# Patient Record
Sex: FEMALE
[De-identification: names, ages, dates, MRNs, and addresses within clinical notes are randomized per-mention and may not be internally consistent; named-entity substitution may affect disease eponyms.]

---

## 2019-12-13 ENCOUNTER — HOSPITAL ENCOUNTER (OUTPATIENT)
Dept: HOSPITAL 75 - RAD | Age: 19
End: 2019-12-13
Attending: OBSTETRICS & GYNECOLOGY
Payer: COMMERCIAL

## 2019-12-13 DIAGNOSIS — Z3A.20: ICD-10-CM

## 2019-12-13 DIAGNOSIS — Z34.92: Primary | ICD-10-CM

## 2019-12-13 PROCEDURE — 76805 OB US >/= 14 WKS SNGL FETUS: CPT

## 2019-12-13 NOTE — DIAGNOSTIC IMAGING REPORT
INDICATION: Fetal anatomic assessment during normal pregnancy.



TECHNIQUE: Multiple real-time grayscale images were obtained over

the gravid uterus.



COMPARISON: None.



FINDINGS: Single viable intrauterine pregnancy, currently in

breech presentation. Posterior-positioned placenta without

evidence for previa. Normal amount volume of amniotic fluid,

index 12.11 cm.



The visualized anatomical structures including the kidneys,

bladder, stomach, intracranial structures, as well as

four-chamber heart appearing unremarkable. The extremities and

fetal spine as well as three-vessel cord and cord insertion site

appearing unremarkable. Pelvic structures including gender

determination unable to be assessed secondary to fetal

positioning. Gestational sac configuration is unremarkable.



Biometrical measurements are as follows:

Biparietal 4.60 cm, age 20 weeks 0 days.

Head circumference 17.78 cm, age 20 weeks 2 days.

Abdominal circumference 14.45 cm, age 19 weeks 6 days.

Femur length 3.38 cm, age 20 weeks 5 days.



Sonographic estimate age: 20 weeks 2 days.

Sonographic estimated date of delivery: 04/29/2020.



Estimated Fetal Weight: 336 gm (+/- 49  gm).

LMP percentile: 31%.



Fetal heart rate: 147 beats per minute.



Fetal number: 1 of 1.



IMPRESSION:

1. Single viable intrauterine pregnancy, currently in breech

presentation. Sonographic estimated age at 20 weeks 2 days for an

estimated date of delivery of April 29, 2020. No abnormalities

demonstrated at this time.



Dictated by: 



  Dictated on workstation # [id number]

## 2020-02-21 ENCOUNTER — HOSPITAL ENCOUNTER (OUTPATIENT)
Dept: HOSPITAL 75 - RAD | Age: 20
End: 2020-02-21
Attending: OBSTETRICS & GYNECOLOGY
Payer: COMMERCIAL

## 2020-02-21 DIAGNOSIS — O26.843: Primary | ICD-10-CM

## 2020-02-21 DIAGNOSIS — Z3A.29: ICD-10-CM

## 2020-02-21 PROCEDURE — 76816 OB US FOLLOW-UP PER FETUS: CPT

## 2020-03-10 ENCOUNTER — HOSPITAL ENCOUNTER (OUTPATIENT)
Dept: HOSPITAL 75 - WSO | Age: 20
Discharge: HOME | End: 2020-03-10
Attending: OBSTETRICS & GYNECOLOGY
Payer: SELF-PAY

## 2020-03-10 VITALS — WEIGHT: 146.61 LBS | BODY MASS INDEX: 25.03 KG/M2 | HEIGHT: 64.17 IN

## 2020-03-10 VITALS — DIASTOLIC BLOOD PRESSURE: 57 MMHG | SYSTOLIC BLOOD PRESSURE: 116 MMHG

## 2020-03-10 LAB
ALBUMIN SERPL-MCNC: 3.5 GM/DL (ref 3.2–4.5)
ALP SERPL-CCNC: 158 U/L (ref 40–136)
ALT SERPL-CCNC: 23 U/L (ref 0–55)
BASOPHILS # BLD AUTO: 0 10^3/UL (ref 0–0.1)
BASOPHILS NFR BLD AUTO: 0 % (ref 0–10)
BASOPHILS NFR BLD MANUAL: 0 %
BILIRUB SERPL-MCNC: 0.3 MG/DL (ref 0.1–1)
BUN/CREAT SERPL: 10
CALCIUM SERPL-MCNC: 9.3 MG/DL (ref 8.5–10.1)
CHLORIDE SERPL-SCNC: 108 MMOL/L (ref 98–107)
CO2 SERPL-SCNC: 20 MMOL/L (ref 21–32)
CREAT SERPL-MCNC: 0.59 MG/DL (ref 0.6–1.3)
EOSINOPHIL # BLD AUTO: 0.2 10^3/UL (ref 0–0.3)
EOSINOPHIL NFR BLD AUTO: 1 % (ref 0–10)
EOSINOPHIL NFR BLD MANUAL: 0 %
ERYTHROCYTE [DISTWIDTH] IN BLOOD BY AUTOMATED COUNT: 13 % (ref 10–14.5)
GFR SERPLBLD BASED ON 1.73 SQ M-ARVRAT: > 60 ML/MIN
GLUCOSE SERPL-MCNC: 89 MG/DL (ref 70–105)
HCT VFR BLD CALC: 38 % (ref 35–52)
HGB BLD-MCNC: 12.7 G/DL (ref 11.5–16)
LYMPHOCYTES # BLD AUTO: 2.1 X 10^3 (ref 1–4)
LYMPHOCYTES NFR BLD AUTO: 16 % (ref 12–44)
MCH RBC QN AUTO: 32 PG (ref 25–34)
MCHC RBC AUTO-ENTMCNC: 34 G/DL (ref 32–36)
MCV RBC AUTO: 95 FL (ref 80–99)
MONOCYTES # BLD AUTO: 1.1 X 10^3 (ref 0–1)
MONOCYTES NFR BLD AUTO: 9 % (ref 0–12)
MONOCYTES NFR BLD: 5 %
NEUTROPHILS # BLD AUTO: 9.3 X 10^3 (ref 1.8–7.8)
NEUTROPHILS NFR BLD AUTO: 74 % (ref 42–75)
NEUTS BAND NFR BLD MANUAL: 74 %
NEUTS BAND NFR BLD: 3 %
PLATELET # BLD: 171 10^3/UL (ref 130–400)
PMV BLD AUTO: 10.1 FL (ref 7.4–10.4)
POTASSIUM SERPL-SCNC: 3.7 MMOL/L (ref 3.6–5)
PROT SERPL-MCNC: 7.1 GM/DL (ref 6.4–8.2)
RBC MORPH BLD: NORMAL
SODIUM SERPL-SCNC: 137 MMOL/L (ref 135–145)
VARIANT LYMPHS NFR BLD MANUAL: 18 %
WBC # BLD AUTO: 12.7 10^3/UL (ref 4.3–11)

## 2020-03-10 PROCEDURE — 36415 COLL VENOUS BLD VENIPUNCTURE: CPT

## 2020-03-10 PROCEDURE — 85027 COMPLETE CBC AUTOMATED: CPT

## 2020-03-10 PROCEDURE — 99214 OFFICE O/P EST MOD 30 MIN: CPT

## 2020-03-10 PROCEDURE — 80053 COMPREHEN METABOLIC PANEL: CPT

## 2020-03-10 PROCEDURE — 85007 BL SMEAR W/DIFF WBC COUNT: CPT

## 2020-03-10 NOTE — NUR
NEHA CAR presented to unit via ambulatory, accompanied by significant other, with 
c/o LOWER ABDOMINAL PAIN. NEHA CAR weighed, gowned, voided, and to bed.  EFHM and 
TOCO applied, VS taken.  NEHA CAR oriented to bed controls, call light, TV, heat, 
and A/C controls.

## 2020-03-10 NOTE — NUR
THIS RN AT BEDSIDE. PT CO INTERMITTENT LOWER ABDOMINAL & BACK PAIN FOR LAST 2 DAYS. PT 
SITTING QUIETLY, FLAT AFFECT, TEXTING ON PHONE, RATES PAIN 9/10. PT STATES  SHES HAD ABOUT 5 
 CUPS OF WATER TO DRINK TODAY. WHEN ASKED, PT STATES THAT SHE HAS HAD BURNING WHILE SEE 
URINATES, INCREASED URGENCY AND FREQUENCY FOR THE LAST 2 DAYS. N/V SINCE 2 DAYS. TYLENOL 
DECREASES PAIN ONLY SLIGHTLY. 



DR WEBBER ON UNIT AT 1400, THIS RN DISCUSSES PT REPORT WITH DR WEBBER, PRENATALS REVIEWED. DR WEBBER DOES NOT WANT SVE DONE, OCCASIONALLY UC, FHT STRIP  REVIEWED  BY DR WEBBER. URINE 
DIPSTICK RESULTS READ TO DR WEBBER. DR WEBBER ORDERS CBC & CMP.

## 2020-03-10 NOTE — NUR
RN CALLED DR WEBBER BACK AT THIS TIME. ORDER TO DISCHARGE PT TO HOME. RX FOR MACROBID CALLED 
INTO PT PREFERRED PHARMACY. DC INSTRUCTIONS GIVEN AND EXPLAINED TO PT AND SO. QUESTIONS 
ANSWERED.

## 2020-03-11 NOTE — PHYSICIAN QUERY-FINAL DX
Clinic Account Progress/Dx


Physician Query:


Please give diagnosis





Please include # weeks gestation


Date of Service





Mar 10, 2020 at 13:00











LEWIS COLEMAN                    Mar 11, 2020 08:27

## 2020-04-06 ENCOUNTER — HOSPITAL ENCOUNTER (OUTPATIENT)
Dept: HOSPITAL 75 - RAD | Age: 20
End: 2020-04-06
Attending: OBSTETRICS & GYNECOLOGY
Payer: COMMERCIAL

## 2020-04-06 DIAGNOSIS — O26.843: Primary | ICD-10-CM

## 2020-04-06 DIAGNOSIS — Z3A.35: ICD-10-CM

## 2020-04-06 PROCEDURE — 76805 OB US >/= 14 WKS SNGL FETUS: CPT

## 2020-04-06 PROCEDURE — 76816 OB US FOLLOW-UP PER FETUS: CPT

## 2020-04-06 NOTE — DIAGNOSTIC IMAGING REPORT
INDICATION: 

Evaluate fetal growth, position, and amniotic fluid volume.



TECHNIQUE: 

Multiple Real-time grayscale images were obtained over the gravid

uterus.



COMPARISON: 

02/21/2020.



FINDINGS: 

There is a single live fetus in a cephalic presentation. The

fetal heart rate was recorded at 130 BPM. The placenta is to the

left. The amniotic fluid index is lower limits at 7.9 cm. The

cervical length is approximately 3.0 cm.



Biometrical measurements are as follows:

Biparietal 9.09 cm, age 37 weeks 2 days.

Head circumference 32.81 cm, age 37 weeks 2 days.

Abdominal circumference 29.66 cm, age 33 weeks 5 days.

Femur length 6.71 cm, age 34 weeks 4 days.



Sonographic estimate age: 35 weeks 5 days.

Sonographic estimated date of delivery: 05/06/20.



Estimated Fetal Weight: 2463 gm (+/- 360  gm).

LMP percentile: 8%.



Fetal heart rate: 130 beats per minute.



Fetal number: 1 of 1.



IMPRESSION: 

Single live IUP of approximately 35-36 weeks gestational age

showing normal interval growth when compared with the exam from

02/21/2020. The amniotic fluid volume is lower limits of normal

with an LUDWIG of 7.9 cm.



Dictated by: 



  Dictated on workstation # [id number]

## 2020-04-15 ENCOUNTER — HOSPITAL ENCOUNTER (INPATIENT)
Dept: HOSPITAL 75 - LDRP | Age: 20
LOS: 4 days | Discharge: HOME | End: 2020-04-19
Attending: OBSTETRICS & GYNECOLOGY | Admitting: OBSTETRICS & GYNECOLOGY
Payer: COMMERCIAL

## 2020-04-15 VITALS — SYSTOLIC BLOOD PRESSURE: 105 MMHG | DIASTOLIC BLOOD PRESSURE: 67 MMHG

## 2020-04-15 VITALS — SYSTOLIC BLOOD PRESSURE: 115 MMHG | DIASTOLIC BLOOD PRESSURE: 83 MMHG

## 2020-04-15 VITALS — SYSTOLIC BLOOD PRESSURE: 122 MMHG | DIASTOLIC BLOOD PRESSURE: 83 MMHG

## 2020-04-15 VITALS — SYSTOLIC BLOOD PRESSURE: 118 MMHG | DIASTOLIC BLOOD PRESSURE: 83 MMHG

## 2020-04-15 VITALS — SYSTOLIC BLOOD PRESSURE: 125 MMHG | DIASTOLIC BLOOD PRESSURE: 61 MMHG

## 2020-04-15 VITALS — HEIGHT: 63.39 IN | BODY MASS INDEX: 26.74 KG/M2 | WEIGHT: 152.78 LBS

## 2020-04-15 VITALS — SYSTOLIC BLOOD PRESSURE: 111 MMHG | DIASTOLIC BLOOD PRESSURE: 71 MMHG

## 2020-04-15 VITALS — SYSTOLIC BLOOD PRESSURE: 116 MMHG | DIASTOLIC BLOOD PRESSURE: 78 MMHG

## 2020-04-15 VITALS — DIASTOLIC BLOOD PRESSURE: 56 MMHG | SYSTOLIC BLOOD PRESSURE: 118 MMHG

## 2020-04-15 VITALS — DIASTOLIC BLOOD PRESSURE: 80 MMHG | SYSTOLIC BLOOD PRESSURE: 126 MMHG

## 2020-04-15 VITALS — DIASTOLIC BLOOD PRESSURE: 57 MMHG | SYSTOLIC BLOOD PRESSURE: 107 MMHG

## 2020-04-15 VITALS — DIASTOLIC BLOOD PRESSURE: 85 MMHG | SYSTOLIC BLOOD PRESSURE: 123 MMHG

## 2020-04-15 VITALS — SYSTOLIC BLOOD PRESSURE: 115 MMHG | DIASTOLIC BLOOD PRESSURE: 77 MMHG

## 2020-04-15 VITALS — SYSTOLIC BLOOD PRESSURE: 119 MMHG | DIASTOLIC BLOOD PRESSURE: 70 MMHG

## 2020-04-15 DIAGNOSIS — O41.1230: ICD-10-CM

## 2020-04-15 DIAGNOSIS — Z3A.38: ICD-10-CM

## 2020-04-15 LAB
BASOPHILS # BLD AUTO: 0 10^3/UL (ref 0–0.1)
BASOPHILS NFR BLD AUTO: 0 % (ref 0–10)
EOSINOPHIL # BLD AUTO: 0.2 10^3/UL (ref 0–0.3)
EOSINOPHIL NFR BLD AUTO: 2 % (ref 0–10)
ERYTHROCYTE [DISTWIDTH] IN BLOOD BY AUTOMATED COUNT: 12.9 % (ref 10–14.5)
HCT VFR BLD CALC: 40 % (ref 35–52)
HGB BLD-MCNC: 13.5 G/DL (ref 11.5–16)
LYMPHOCYTES # BLD AUTO: 2.2 X 10^3 (ref 1–4)
LYMPHOCYTES NFR BLD AUTO: 20 % (ref 12–44)
MANUAL DIFFERENTIAL PERFORMED BLD QL: NO
MCH RBC QN AUTO: 31 PG (ref 25–34)
MCHC RBC AUTO-ENTMCNC: 34 G/DL (ref 32–36)
MCV RBC AUTO: 93 FL (ref 80–99)
MONOCYTES # BLD AUTO: 0.9 X 10^3 (ref 0–1)
MONOCYTES NFR BLD AUTO: 8 % (ref 0–12)
NEUTROPHILS # BLD AUTO: 7.7 X 10^3 (ref 1.8–7.8)
NEUTROPHILS NFR BLD AUTO: 70 % (ref 42–75)
PLATELET # BLD: 174 10^3/UL (ref 130–400)
PMV BLD AUTO: 10.5 FL (ref 7.4–10.4)
WBC # BLD AUTO: 10.9 10^3/UL (ref 4.3–11)

## 2020-04-15 PROCEDURE — 85025 COMPLETE CBC W/AUTO DIFF WBC: CPT

## 2020-04-15 PROCEDURE — 87185 SC STD ENZYME DETCJ PER NZM: CPT

## 2020-04-15 PROCEDURE — 87205 SMEAR GRAM STAIN: CPT

## 2020-04-15 PROCEDURE — 86900 BLOOD TYPING SEROLOGIC ABO: CPT

## 2020-04-15 PROCEDURE — 85027 COMPLETE CBC AUTOMATED: CPT

## 2020-04-15 PROCEDURE — 86850 RBC ANTIBODY SCREEN: CPT

## 2020-04-15 PROCEDURE — 80053 COMPREHEN METABOLIC PANEL: CPT

## 2020-04-15 PROCEDURE — 85007 BL SMEAR W/DIFF WBC COUNT: CPT

## 2020-04-15 PROCEDURE — 36415 COLL VENOUS BLD VENIPUNCTURE: CPT

## 2020-04-15 PROCEDURE — 87804 INFLUENZA ASSAY W/OPTIC: CPT

## 2020-04-15 PROCEDURE — 87077 CULTURE AEROBIC IDENTIFY: CPT

## 2020-04-15 PROCEDURE — 88307 TISSUE EXAM BY PATHOLOGIST: CPT

## 2020-04-15 PROCEDURE — 87186 SC STD MICRODIL/AGAR DIL: CPT

## 2020-04-15 PROCEDURE — 87075 CULTR BACTERIA EXCEPT BLOOD: CPT

## 2020-04-15 PROCEDURE — 87635 SARS-COV-2 COVID-19 AMP PRB: CPT

## 2020-04-15 PROCEDURE — 87088 URINE BACTERIA CULTURE: CPT

## 2020-04-15 PROCEDURE — 83605 ASSAY OF LACTIC ACID: CPT

## 2020-04-15 PROCEDURE — 87070 CULTURE OTHR SPECIMN AEROBIC: CPT

## 2020-04-15 PROCEDURE — 87076 CULTURE ANAEROBE IDENT EACH: CPT

## 2020-04-15 PROCEDURE — 86901 BLOOD TYPING SEROLOGIC RH(D): CPT

## 2020-04-15 RX ADMIN — SODIUM CHLORIDE, SODIUM LACTATE, POTASSIUM CHLORIDE, AND CALCIUM CHLORIDE SCH MLS/HR: 600; 310; 30; 20 INJECTION, SOLUTION INTRAVENOUS at 20:05

## 2020-04-15 RX ADMIN — SODIUM CHLORIDE, SODIUM LACTATE, POTASSIUM CHLORIDE, CALCIUM CHLORIDE, AND DEXTROSE MONOHYDRATE SCH MLS/HR: 600; 310; 30; 20; 5 INJECTION, SOLUTION INTRAVENOUS at 15:57

## 2020-04-15 RX ADMIN — SODIUM CHLORIDE, SODIUM LACTATE, POTASSIUM CHLORIDE, CALCIUM CHLORIDE, AND DEXTROSE MONOHYDRATE SCH MLS/HR: 600; 310; 30; 20; 5 INJECTION, SOLUTION INTRAVENOUS at 22:10

## 2020-04-15 RX ADMIN — SODIUM CHLORIDE, SODIUM LACTATE, POTASSIUM CHLORIDE, AND CALCIUM CHLORIDE SCH MLS/HR: 600; 310; 30; 20 INJECTION, SOLUTION INTRAVENOUS at 16:42

## 2020-04-15 RX ADMIN — MISOPROSTOL SCH MCG: 100 TABLET ORAL at 23:26

## 2020-04-15 RX ADMIN — MISOPROSTOL SCH MCG: 100 TABLET ORAL at 19:25

## 2020-04-15 NOTE — NUR
Dr Vazquez notified of vag exam of one and pt now desires to wait for epidural. Physician will 
allow regular diet for dinner then clear liquids.

## 2020-04-15 NOTE — NUR
Dr Vazquez called to inquire - informed her that pt rated pain an 8 was wanting and epidural. 
LR bolus infusing. Plan to re-check pt after epidural and call physician with exam.

## 2020-04-15 NOTE — NUR
NEHA MARSH presented to unit via ambulatory  from outpt registration, accompanied 
by  , with c/o ruptured membranes in office.  NEHA MARSH weighed, gowned, 
voided, and to bed.  EFHM and TOCO applied, VS taken.  NEHA MARSH oriented to bed 
controls, call light, TV, heat, and A/C controls.

## 2020-04-15 NOTE — XMS REPORT
Continuity of Care Document

                             Created on: 04/15/2020



NEAH MARSH

External Reference #: [id number]

: 2000

Sex: Female



Demographics





                          Address                   [address] [place]  [address]

 

                          Home Phone                [phone] x

 

                          Preferred Language        Unknown

 

                          Marital Status            Unknown

 

                          Jewish Affiliation     Unknown

 

                          Race                      Unknown

 

                          Ethnic Group              Unknown





Author





                          Organization              Unknown

 

                          Address                   Unknown

 

                          Phone                     Unavailable



              



Allergies

      



             Active           Description           Code           Type         

  Severity   

                Reaction           Onset           Reported/Identified          

 

Relationship to Patient                 Clinical Status        

 

                Yes             No Known Drug Allergies           [id number]    

       Drug 

Allergy           Unknown           N/A                             03/10/2020  

      

                                                             



                  



Medications

      



There is no data.                  



Problems

      



             Date Dx Coded           Attending           Type           Code    

       

Diagnosis                               Diagnosed By        

 

             2019           DUANE WEBBER DO           Ot           Z34.9

2           

ENCNTR FOR SUPRVSN OF NORMAL PREG, UNSP,                    

 

             2019           AKBAR DO DUANE C           Ot           Z3A.2

0           

20 WEEKS GESTATION OF PREGNANCY                    

 

             2019           WEBBER DO DUANE C           Ot           Z34.9

2           

ENCNTR FOR SUPRVSN OF NORMAL PREG, UNSP,                    

 

             2019           WEBBER DO DUANE C           Ot           Z3A.2

0           

20 WEEKS GESTATION OF PREGNANCY                    

 

             2020           AKBAR SYLVESTER DUANE C           Ot           Z34.9

2           

ENCNTR FOR SUPRVSN OF NORMAL PREG, UNSP,                    

 

             2020           AKBAR DO DUANE C           Ot           Z3A.2

0           

20 WEEKS GESTATION OF PREGNANCY                    

 

             2020           AKBAR DO DUANE C           Ot           O26.8

43           

UTERINE SIZE-DATE DISCREPANCY, THIRD TRI                    

 

             2020           WEBBER DO DUANE C           Ot           Z3A.2

9           

29 WEEKS GESTATION OF PREGNANCY                    

 

             2020           WEBBER DO DUANE C           Ot           O26.8

43           

UTERINE SIZE-DATE DISCREPANCY, THIRD TRI                    

 

             2020           WEBBER DO DUANE C           Ot           Z3A.3

5           

35 WEEKS GESTATION OF PREGNANCY                    



                                    



Procedures

      



There is no data.                  



Results

      



There is no data.              



Encounters

      



                ACCT No.           Visit Date/Time           Discharge          

 Status         

             Pt. Type           Provider           Facility           Loc./Unit 

          

Complaint        

 

                    [id number]           2020 10:39:00           

020 23:59:59        

                CLS             Outpatient           AKBAR SYLVESTER DUANE C          

 [facility]           RAD                       FUNDAL HEIGHT LOW FOR D

ATES IN 

THIRD TRIMESTER,34        

 

                    [id number]           2020 10:05:00           

020 23:59:59        

                CLS             Outpatient           DUANE WEBBER DO          

 [facility]           RAD                       FETAL SIZE        

 

                    [id number]           2019 13:07:00           

019 23:59:59        

                CLS             Outpatient           DUANE WEBBER DO          

 [facility]           RAD                       FETUS PRESENT DURING PA

EGNANCY IN 

2ND TRI        

 

             [id number]           2020 08:32:00                         

            

Document Registration

## 2020-04-15 NOTE — NUR
To pt room.  at bedside. Pt appears to be comfortable. Cytotec 50mcg given PO. Pt and 
 have no needs at this time.

## 2020-04-16 VITALS — DIASTOLIC BLOOD PRESSURE: 78 MMHG | SYSTOLIC BLOOD PRESSURE: 124 MMHG

## 2020-04-16 VITALS — DIASTOLIC BLOOD PRESSURE: 79 MMHG | SYSTOLIC BLOOD PRESSURE: 122 MMHG

## 2020-04-16 VITALS — SYSTOLIC BLOOD PRESSURE: 116 MMHG | DIASTOLIC BLOOD PRESSURE: 66 MMHG

## 2020-04-16 VITALS — SYSTOLIC BLOOD PRESSURE: 101 MMHG | DIASTOLIC BLOOD PRESSURE: 52 MMHG

## 2020-04-16 VITALS — DIASTOLIC BLOOD PRESSURE: 79 MMHG | SYSTOLIC BLOOD PRESSURE: 121 MMHG

## 2020-04-16 VITALS — DIASTOLIC BLOOD PRESSURE: 76 MMHG | SYSTOLIC BLOOD PRESSURE: 119 MMHG

## 2020-04-16 VITALS — SYSTOLIC BLOOD PRESSURE: 94 MMHG | DIASTOLIC BLOOD PRESSURE: 53 MMHG

## 2020-04-16 VITALS — SYSTOLIC BLOOD PRESSURE: 115 MMHG | DIASTOLIC BLOOD PRESSURE: 70 MMHG

## 2020-04-16 VITALS — SYSTOLIC BLOOD PRESSURE: 116 MMHG | DIASTOLIC BLOOD PRESSURE: 57 MMHG

## 2020-04-16 VITALS — DIASTOLIC BLOOD PRESSURE: 52 MMHG | SYSTOLIC BLOOD PRESSURE: 98 MMHG

## 2020-04-16 VITALS — SYSTOLIC BLOOD PRESSURE: 114 MMHG | DIASTOLIC BLOOD PRESSURE: 63 MMHG

## 2020-04-16 VITALS — SYSTOLIC BLOOD PRESSURE: 105 MMHG | DIASTOLIC BLOOD PRESSURE: 66 MMHG

## 2020-04-16 VITALS — SYSTOLIC BLOOD PRESSURE: 108 MMHG | DIASTOLIC BLOOD PRESSURE: 51 MMHG

## 2020-04-16 VITALS — DIASTOLIC BLOOD PRESSURE: 57 MMHG | SYSTOLIC BLOOD PRESSURE: 107 MMHG

## 2020-04-16 VITALS — SYSTOLIC BLOOD PRESSURE: 95 MMHG | DIASTOLIC BLOOD PRESSURE: 59 MMHG

## 2020-04-16 VITALS — DIASTOLIC BLOOD PRESSURE: 53 MMHG | SYSTOLIC BLOOD PRESSURE: 97 MMHG

## 2020-04-16 VITALS — DIASTOLIC BLOOD PRESSURE: 59 MMHG | SYSTOLIC BLOOD PRESSURE: 90 MMHG

## 2020-04-16 VITALS — DIASTOLIC BLOOD PRESSURE: 52 MMHG | SYSTOLIC BLOOD PRESSURE: 106 MMHG

## 2020-04-16 VITALS — DIASTOLIC BLOOD PRESSURE: 58 MMHG | SYSTOLIC BLOOD PRESSURE: 103 MMHG

## 2020-04-16 VITALS — DIASTOLIC BLOOD PRESSURE: 71 MMHG | SYSTOLIC BLOOD PRESSURE: 112 MMHG

## 2020-04-16 VITALS — SYSTOLIC BLOOD PRESSURE: 90 MMHG | DIASTOLIC BLOOD PRESSURE: 52 MMHG

## 2020-04-16 VITALS — SYSTOLIC BLOOD PRESSURE: 128 MMHG | DIASTOLIC BLOOD PRESSURE: 59 MMHG

## 2020-04-16 VITALS — SYSTOLIC BLOOD PRESSURE: 114 MMHG | DIASTOLIC BLOOD PRESSURE: 69 MMHG

## 2020-04-16 VITALS — SYSTOLIC BLOOD PRESSURE: 121 MMHG | DIASTOLIC BLOOD PRESSURE: 75 MMHG

## 2020-04-16 VITALS — SYSTOLIC BLOOD PRESSURE: 101 MMHG | DIASTOLIC BLOOD PRESSURE: 53 MMHG

## 2020-04-16 VITALS — DIASTOLIC BLOOD PRESSURE: 63 MMHG | SYSTOLIC BLOOD PRESSURE: 101 MMHG

## 2020-04-16 VITALS — SYSTOLIC BLOOD PRESSURE: 102 MMHG | DIASTOLIC BLOOD PRESSURE: 56 MMHG

## 2020-04-16 VITALS — SYSTOLIC BLOOD PRESSURE: 104 MMHG | DIASTOLIC BLOOD PRESSURE: 65 MMHG

## 2020-04-16 VITALS — DIASTOLIC BLOOD PRESSURE: 61 MMHG | SYSTOLIC BLOOD PRESSURE: 121 MMHG

## 2020-04-16 LAB
ALBUMIN SERPL-MCNC: 3.7 GM/DL (ref 3.2–4.5)
ALP SERPL-CCNC: 247 U/L (ref 40–136)
ALT SERPL-CCNC: 20 U/L (ref 0–55)
BASOPHILS # BLD AUTO: 0 10^3/UL (ref 0–0.1)
BASOPHILS NFR BLD AUTO: 0 % (ref 0–10)
BASOPHILS NFR BLD MANUAL: 0 %
BILIRUB SERPL-MCNC: 0.6 MG/DL (ref 0.1–1)
BUN/CREAT SERPL: 6
CALCIUM SERPL-MCNC: 9.9 MG/DL (ref 8.5–10.1)
CHLORIDE SERPL-SCNC: 107 MMOL/L (ref 98–107)
CO2 SERPL-SCNC: 18 MMOL/L (ref 21–32)
CREAT SERPL-MCNC: 0.65 MG/DL (ref 0.6–1.3)
EOSINOPHIL # BLD AUTO: 0.1 10^3/UL (ref 0–0.3)
EOSINOPHIL NFR BLD AUTO: 0 % (ref 0–10)
EOSINOPHIL NFR BLD MANUAL: 0 %
ERYTHROCYTE [DISTWIDTH] IN BLOOD BY AUTOMATED COUNT: 13.1 % (ref 10–14.5)
GFR SERPLBLD BASED ON 1.73 SQ M-ARVRAT: > 60 ML/MIN
GLUCOSE SERPL-MCNC: 82 MG/DL (ref 70–105)
HCT VFR BLD CALC: 44 % (ref 35–52)
HGB BLD-MCNC: 14.7 G/DL (ref 11.5–16)
LYMPHOCYTES # BLD AUTO: 1.9 X 10^3 (ref 1–4)
LYMPHOCYTES NFR BLD AUTO: 10 % (ref 12–44)
MANUAL DIFFERENTIAL PERFORMED BLD QL: YES
MCH RBC QN AUTO: 32 PG (ref 25–34)
MCHC RBC AUTO-ENTMCNC: 34 G/DL (ref 32–36)
MCV RBC AUTO: 95 FL (ref 80–99)
METAMYELOCYTES NFR BLD: 1 %
MONOCYTES # BLD AUTO: 1.7 X 10^3 (ref 0–1)
MONOCYTES NFR BLD AUTO: 8 % (ref 0–12)
MONOCYTES NFR BLD: 7 %
NEUTROPHILS # BLD AUTO: 16.2 X 10^3 (ref 1.8–7.8)
NEUTROPHILS NFR BLD AUTO: 82 % (ref 42–75)
NEUTS BAND NFR BLD MANUAL: 81 %
NEUTS BAND NFR BLD: 2 %
PLATELET # BLD: 138 10^3/UL (ref 130–400)
PMV BLD AUTO: 10.9 FL (ref 7.4–10.4)
POTASSIUM SERPL-SCNC: 4 MMOL/L (ref 3.6–5)
PROT SERPL-MCNC: 7.7 GM/DL (ref 6.4–8.2)
SODIUM SERPL-SCNC: 138 MMOL/L (ref 135–145)
TOXIC GRANULES BLD QL SMEAR: (no result)
VARIANT LYMPHS NFR BLD MANUAL: 1 %
VARIANT LYMPHS NFR BLD MANUAL: 8 %
WBC # BLD AUTO: 19.8 10^3/UL (ref 4.3–11)

## 2020-04-16 PROCEDURE — 0HQ9XZZ REPAIR PERINEUM SKIN, EXTERNAL APPROACH: ICD-10-PCS | Performed by: OBSTETRICS & GYNECOLOGY

## 2020-04-16 RX ADMIN — METRONIDAZOLE SCH MLS/HR: 5 INJECTION, SOLUTION INTRAVENOUS at 11:27

## 2020-04-16 RX ADMIN — IBUPROFEN SCH MG: 600 TABLET ORAL at 17:51

## 2020-04-16 RX ADMIN — Medication SCH MLS/HR: at 08:50

## 2020-04-16 RX ADMIN — ACETAMINOPHEN SCH MG: 500 TABLET ORAL at 23:56

## 2020-04-16 RX ADMIN — DOCUSATE SODIUM SCH MG: 100 CAPSULE ORAL at 20:46

## 2020-04-16 RX ADMIN — Medication SCH MLS/HR: at 11:43

## 2020-04-16 RX ADMIN — MINERAL OIL PRN ML: 1 OIL ORAL at 09:55

## 2020-04-16 RX ADMIN — MINERAL OIL PRN ML: 1 OIL ORAL at 09:58

## 2020-04-16 RX ADMIN — ACETAMINOPHEN SCH MG: 500 TABLET ORAL at 15:58

## 2020-04-16 RX ADMIN — AMPICILLIN SODIUM AND SULBACTAM SODIUM SCH MLS/HR: 2; 1 INJECTION, POWDER, FOR SOLUTION INTRAMUSCULAR; INTRAVENOUS at 17:52

## 2020-04-16 RX ADMIN — IBUPROFEN SCH MG: 600 TABLET ORAL at 11:28

## 2020-04-16 RX ADMIN — AMPICILLIN SODIUM AND SULBACTAM SODIUM SCH MLS/HR: 2; 1 INJECTION, POWDER, FOR SOLUTION INTRAMUSCULAR; INTRAVENOUS at 09:35

## 2020-04-16 RX ADMIN — IBUPROFEN SCH MG: 600 TABLET ORAL at 23:56

## 2020-04-16 RX ADMIN — Medication SCH MLS/HR: at 10:47

## 2020-04-16 RX ADMIN — SODIUM CHLORIDE, SODIUM LACTATE, POTASSIUM CHLORIDE, CALCIUM CHLORIDE, AND DEXTROSE MONOHYDRATE SCH MLS/HR: 600; 310; 30; 20; 5 INJECTION, SOLUTION INTRAVENOUS at 05:30

## 2020-04-16 RX ADMIN — METRONIDAZOLE SCH MLS/HR: 5 INJECTION, SOLUTION INTRAVENOUS at 20:46

## 2020-04-16 NOTE — OB LABOR & DELIVERY RECORD
Vag Delivery Note


Vag Delivery Note


Date of Delivery: 20 





Preoperative Diagnosis: Jose Mejia  is a 20  /Para   1/0 ,

Gestational Age 38 1/7 WEEKS, FEVER AND TACHYCARDIA IN LABOR


Postoperative Diagnosis: Same, FLU NEGATIVE; 


Surgeon: DUANE WEBBER 





Anesthesia: EPIDURAL





Delivery Type: VAGINAL





Findings: 


Viable FEMALE infant, apgars 9/9, weight 2700 GRAMS, FETAL TEMP - 101 AT 

DELIVERY, FOUL SMELLING FLUID


Lacerations: 1ST DEGREE


Intact placenta with 3 vessel cord. Nuchal cord, DELIVERED THROUGH, body cord or

shoulder dystocia








Estimated Blood Loss: 200 ml





Complications: None





Condition: Stable





Description of Procedure:





The patient is a 19 year old female who presented WITH VAGINAL BLEEDING AND 

RUPTURE OF MEMBRANES IN OFFICE.  She was not in labor (premature ROM at 38 

weeks, GBS -). She was admitted and informed consent was obtained. Her labor 

course was remarkable for augmentation, fever noted to 103 just prior to 

delivery.  RN noted a few hours prior that her cervix was unchanged.  She had 

one dose of misoprostol orally due to premature rupture of membranes (cervix was

unfavorable in the clinic)Second dose was held due to frequent contractions and 

no accelerations.  She was started on Oxytocin but had variable decelerations 

with only 2 mu of pitocin.  Approximately 545 am I was notified that the patient

had suddenly developed tachycardia.  She had baseline heartrate about 100-110 

and suddenly this accelerated to 130.  just prior to this she had been redosed 

on her epidural.  She also was afebrile.  a lab workup was initiated. This also 

included swabs for influenza and Corona virus. She was thus treated as a PUI 

during the rest of labor and delivery.  Approximately 0645, her temp recorded as

37.  Then at 0730 I was notified temperature was 38.6.  She was given 650 mg 

acetaminophen IN  and started on Unasyn.  During the initiation of antibiotics 

treatment I checked her and she was noted to be completely dilated with 2+ 

station.  She progressed to complete dilatation and began to push. 





She was then set up for delivery. The infant's head was delivered atraumatically

in the OA position. The shoulders and remainder of the infant's body were then 

delivered without difficulty. Upon delivery, the head was held below the level 

of the perineum and the mouth and nares were bulb suctioned. The cord was doubly

clamped and cut and the infant was handed off to the pediatric staff. An intact 

placenta with 3-vessel cord delivered via Sania and there was found to be 

minimal bleeding.~ Vigorous fundal massage was performed and the fundus was 

found to be firm. IV oxytocin was given. Examination of the vagina and perineum 

revealed a 1ST DEGREE laceration repaired in the usual fashion with 3-0 vicryl 

suture. Following the repair, sponge, instrument and needle counts were correct.

Mom and baby were both in stable condition in the labor suite.





Vitals - Labs


Vital Signs - I&O





Vital Signs








  Date Time  Temp Pulse Resp B/P (MAP) Pulse Ox O2 Delivery O2 Flow Rate FiO2


 


20 06:45 37.3 137 18 124/78 (93) 100   


 


20 06:00 36.9 131 18 128/59 (82) 100   


 


20 05:00  119 18 119/76 (90) 100   


 


20 04:04  92 20 122/79 (93) 100   


 


20 04:00  119 18 119/76 (90) 100   


 


20 03:00  100 18 114/69 (84) 100   


 


20 02:00  75 20 115/70 (85) 100   


 


20 01:00  71 18 101/63 (76) 100   


 


20 00:00 36.2 83 20 114/63 (80) 100   


 


4/15/20 23:00  90  105/67 (80)   98.00 


 


4/15/20 22:00  104  118/56 (76)    


 


4/15/20 21:00 36.6 90  119/70 (86)    


 


4/15/20 20:00  96  126/80 (95)    


 


4/15/20 18:40  103  125/61 (82)  Room Air  


 


4/15/20 17:30  96  107/57 (74)  Room Air  


 


4/15/20 17:00    115/83 (94)  Room Air  


 


4/15/20 16:30  91  123/85 (98)  Room Air  


 


4/15/20 16:20  97  116/78 (91)    


 


4/15/20 16:00 37.4 92  118/83 (95)    


 


4/15/20 15:43  92  111/71 (84) 98 Room Air  


 


4/15/20 15:20  93 18 116/78 (91)  Room Air  


 


4/15/20 14:50  77  115/77 (90)    


 


4/15/20 14:00 36.8 90 16  97 Room Air  


 


4/15/20 14:00 36.8 99 18 122/83 (96) 97 Room Air  














I & O 


 


 20





 07:00


 


Intake Total 1000 ml


 


Balance 1000 ml











Labs


Laboratory Tests


4/15/20 15:25: 


White Blood Count 10.9, Red Blood Count 4.30L, Hemoglobin 13.5, Hematocrit 40, 

Mean Corpuscular Volume 93, Mean Corpuscular Hemoglobin 31, Mean Corpuscular 

Hemoglobin Concent 34, Red Cell Distribution Width 12.9, Platelet Count 174, 

Mean Platelet Volume 10.5H, Neutrophils (%) (Auto) 70, Lymphocytes (%) (Auto) 

20, Monocytes (%) (Auto) 8, Eosinophils (%) (Auto) 2, Basophils (%) (Auto) 0, 

Neutrophils # (Auto) 7.7, Lymphocytes # (Auto) 2.2, Monocytes # (Auto) 0.9, 

Eosinophils # (Auto) 0.2, Basophils # (Auto) 0.0


20 06:40: 


White Blood Count 19.8H, Red Blood Count 4.60, Hemoglobin 14.7, Hematocrit 44, 

Mean Corpuscular Volume 95, Mean Corpuscular Hemoglobin 32, Mean Corpuscular 

Hemoglobin Concent 34, Red Cell Distribution Width 13.1, Platelet Count 138, 

Mean Platelet Volume 10.9H, Neutrophils (%) (Auto) 82H, Lymphocytes (%) (Auto) 

10L, Monocytes (%) (Auto) 8, Eosinophils (%) (Auto) 0, Basophils (%) (Auto) 0, 

Neutrophils # (Auto) 16.2H, Lymphocytes # (Auto) 1.9, Monocytes # (Auto) 1.7H, 

Eosinophils # (Auto) 0.1, Basophils # (Auto) 0.0, Neutrophils % (Manual) 81, 

Lymphocytes % (Manual) 8, Monocytes % (Manual) 7, Eosinophils % (Manual) 0, 

Basophils % (Manual) 0, Metamyelocytes % 1, Band Neutrophils 2, Reactive 

Lymphocytes 1, Toxic Granulation 1+, Sodium Level 138, Potassium Level 4.0, Ch

loride Level 107, Carbon Dioxide Level 18L, Anion Gap 13, Blood Urea Nitrogen 4L

, Creatinine 0.65, Estimat Glomerular Filtration Rate > 60, BUN/Creatinine Ratio

6, Glucose Level 82, Calcium Level 9.9, Corrected Calcium 10.1, Total Bilirubin 

0.6, Aspartate Amino Transf (AST/SGOT) 27, Alanine Aminotransferase (ALT/SGPT) 

20, Alkaline Phosphatase 247H, Total Protein 7.7, Albumin 3.7


20 08:23: 





Microbiology


20 Influenza Types A,B Antigen (ELLIOT) - Final, Complete











DUANE WEBBER DO               2020 10:32

## 2020-04-16 NOTE — NUR
Up to BR with assistance. Voided large amt. Vanessa care done.

1600 Transferred to postpartum room 303.

## 2020-04-16 NOTE — NUR
Dr Vazquez called and given update. Informed about FHT's variability and decels. Mother's HR 
120's-140's. VE 3cm, no s/s of distress. Pt denies any chest pain, no racing heart, No pain, 
no anxiety. Orders received.

## 2020-04-17 VITALS — SYSTOLIC BLOOD PRESSURE: 110 MMHG | DIASTOLIC BLOOD PRESSURE: 62 MMHG

## 2020-04-17 VITALS — DIASTOLIC BLOOD PRESSURE: 68 MMHG | SYSTOLIC BLOOD PRESSURE: 108 MMHG

## 2020-04-17 VITALS — DIASTOLIC BLOOD PRESSURE: 56 MMHG | SYSTOLIC BLOOD PRESSURE: 109 MMHG

## 2020-04-17 VITALS — SYSTOLIC BLOOD PRESSURE: 104 MMHG | DIASTOLIC BLOOD PRESSURE: 67 MMHG

## 2020-04-17 LAB
BASOPHILS # BLD AUTO: 0 10^3/UL (ref 0–0.1)
BASOPHILS NFR BLD AUTO: 0 % (ref 0–10)
EOSINOPHIL # BLD AUTO: 0.2 10^3/UL (ref 0–0.3)
EOSINOPHIL NFR BLD AUTO: 1 % (ref 0–10)
ERYTHROCYTE [DISTWIDTH] IN BLOOD BY AUTOMATED COUNT: 13.5 % (ref 10–14.5)
HCT VFR BLD CALC: 36 % (ref 35–52)
HGB BLD-MCNC: 11.8 G/DL (ref 11.5–16)
LYMPHOCYTES # BLD AUTO: 2.4 X 10^3 (ref 1–4)
LYMPHOCYTES NFR BLD AUTO: 10 % (ref 12–44)
MANUAL DIFFERENTIAL PERFORMED BLD QL: NO
MCH RBC QN AUTO: 31 PG (ref 25–34)
MCHC RBC AUTO-ENTMCNC: 33 G/DL (ref 32–36)
MCV RBC AUTO: 95 FL (ref 80–99)
MONOCYTES # BLD AUTO: 1.5 X 10^3 (ref 0–1)
MONOCYTES NFR BLD AUTO: 6 % (ref 0–12)
NEUTROPHILS # BLD AUTO: 19.9 X 10^3 (ref 1.8–7.8)
NEUTROPHILS NFR BLD AUTO: 83 % (ref 42–75)
PLATELET # BLD: 142 10^3/UL (ref 130–400)
PMV BLD AUTO: 10.2 FL (ref 7.4–10.4)
WBC # BLD AUTO: 24 10^3/UL (ref 4.3–11)

## 2020-04-17 RX ADMIN — ACETAMINOPHEN SCH MG: 500 TABLET ORAL at 20:19

## 2020-04-17 RX ADMIN — VITAMIN A ACETATE, .BETA.-CAROTENE, ASCORBIC ACID, CHOLECALCIFEROL, .ALPHA.-TOCOPHEROL ACETATE, DL-, THIAMINE MONONITRATE, RIBOFLAVIN, NIACINAMIDE, PYRIDOXINE HYDROCHLORIDE, FOLIC ACID, CYANOCOBALAMIN, CALCIUM CARBONATE, FERROUS FUMARATE, ZINC OXIDE, AND CUPRIC OXIDE SCH EA: 2000; 2000; 120; 400; 22; 1.84; 3; 20; 10; 1; 12; 200; 27; 25; 2 TABLET ORAL at 10:50

## 2020-04-17 RX ADMIN — ACETAMINOPHEN SCH MG: 500 TABLET ORAL at 10:50

## 2020-04-17 RX ADMIN — IBUPROFEN SCH MG: 600 TABLET ORAL at 05:41

## 2020-04-17 RX ADMIN — FERROUS SULFATE TAB 325 MG (65 MG ELEMENTAL FE) SCH MG: 325 (65 FE) TAB at 10:50

## 2020-04-17 RX ADMIN — IBUPROFEN SCH MG: 600 TABLET ORAL at 21:59

## 2020-04-17 RX ADMIN — DOCUSATE SODIUM SCH MG: 100 CAPSULE ORAL at 20:19

## 2020-04-17 RX ADMIN — DOCUSATE SODIUM SCH MG: 100 CAPSULE ORAL at 10:50

## 2020-04-17 RX ADMIN — IBUPROFEN SCH MG: 600 TABLET ORAL at 16:18

## 2020-04-17 NOTE — ANESTHESIA-REGIONAL POST-OP
Regional


Patient Condition


Mental Status:  Alert, Oriented x3


Circulation:  Same as Pre-Op


Headache:  Absent


Sensation:  Full Recovery


Motor Block:  Absent





Post Op Complications


Complications


None





Follow Up Care/Instructions


Patient Instructions


None needed.





Anesthesia/Patient Condition


Patient is doing well, no complaints, stable vital signs, no apparent adverse 

anesthesia problems.   


No complications reported per nursing.











MERT CUMMINGS CRNA          Apr 17, 2020 10:45

## 2020-04-17 NOTE — POSTPARTUM PROGRESS NOTE
Postpartum Note


Postpartum Note


Postpartum Day # 1 s/p 


corona virus, flu a/b -


Chorio - received unasyn and flagyl


Lactic acid was initially elevated bc it didn't get sent down as scheduled (sat 

too long).  Repeat was negative


Has been afebrile since a few hours after delivery.











Subjective:


Patient is without complaints. Ambulating, voiding. Tolerating a regular diet 

without nausea or vomiting. Normal lochia. Pain is well controlled with oral 

pain medications. [] feeding. 





Objective:





Microbiology


20 Influenza Types A,B Antigen (ELLIOT) - Final, Complete


          


4/15/20 Urine Culture - Final, Complete


          3 or more isolates





                          VS - Last 72 Hours, by Label








 4/15/20 4/15/20 4/15/20 4/15/20





 14:00 14:00 14:50 15:20


 


Temp 36.8 36.8  


 


Pulse 99 90 77 93


 


Resp 18 16  18


 


B/P (MAP) 122/83 (96)  115/77 (90) 116/78 (91)


 


Pulse Ox 97 97  


 


O2 Delivery Room Air Room Air  Room Air


 


    





 4/15/20 4/15/20 4/15/20 4/15/20





 15:43 16:00 16:20 16:30


 


Temp  37.4  


 


Pulse 92 92 97 91


 


B/P (MAP) 111/71 (84) 118/83 (95) 116/78 (91) 123/85 (98)


 


Pulse Ox 98   


 


O2 Delivery Room Air   Room Air


 


    





 4/15/20 4/15/20 4/15/20 4/15/20





 17:00 17:30 18:40 20:00


 


Pulse  96 103 96


 


B/P (MAP) 115/83 (94) 107/57 (74) 125/61 (82) 126/80 (95)


 


O2 Delivery Room Air Room Air Room Air 





 4/15/20 4/15/20 4/15/20 4/16/20





 21:00 22:00 23:00 00:00


 


Temp 36.6   36.2


 


Pulse 90 104 90 83


 


Resp    20


 


B/P (MAP) 119/70 (86) 118/56 (76) 105/67 (80) 114/63 (80)


 


Pulse Ox    100


 


O2 Flow Rate   98.00 


 


    





 20





 01:00 02:00 03:00 04:00


 


Pulse 71 75 100 119


 


Resp 18 20 18 18


 


B/P (MAP) 101/63 (76) 115/70 (85) 114/69 (84) 119/76 (90)


 


Pulse Ox 100 100 100 100





 20





 04:04 05:00 06:00 06:45


 


Temp   36.9 37.3


 


Pulse 92 119 131 137


 


Resp 20 18 18 18


 


B/P (MAP) 122/79 (93) 119/76 (90) 128/59 (82) 124/78 (93)


 


Pulse Ox 100 100 100 100


 


    





 20





 07:15 07:30 07:45 08:00


 


Temp  39.6  


 


Pulse 139 140 136 134


 


Resp  18 18 


 


B/P (MAP) 121/79 (93)  112/71 (85) 121/75 (90)


 


Pulse Ox 100 100 100 100


 


    





 20





 08:15 08:30 08:45 08:53


 


Temp   39.8 39.8


 


Pulse 141 142 153 


 


B/P (MAP)  116/66 (83)  


 


Pulse Ox 100 100 100 


 


    





 20





 09:00 09:15 09:30 09:45


 


Temp    38.1


 


Pulse 136 136 129 122


 


Resp  16  


 


B/P (MAP) 121/61 (81)  116/57 (76) 108/51 (70)


 


    





 20





 10:15 10:30 10:45 11:00


 


Pulse 125 122 118 115


 


Resp   16 


 


B/P (MAP) 106/52 (70) 107/57 (74) 97/53 (68) 98/52 (67)





 20





 11:15 11:28 11:30 11:45


 


Temp  38.4  


 


Pulse 110  112 121


 


B/P (MAP) 103/58 (73)  101/52 (68) 90/59 (69)


 


    





 4/16/20 4/16/20 4/16/20 4/16/20





 12:00 12:15 12:30 14:30


 


Temp   37.4 37.8


 


Pulse 115 111 114 113


 


Resp    16


 


B/P (MAP) 95/59 (71) 94/53 (67) 90/52 (65) 101/53 (69)


 


Pulse Ox    97


 


O2 Delivery    Nasal Cannula


 


    





 20





 17:51 17:54 20:40 23:55


 


Temp 36.6 36.6 36.4 36.7


 


Pulse  105 83 90


 


Resp  16 18 18


 


B/P (MAP)  104/65 (78) 105/66 (79) 102/56 (71)


 


Pulse Ox  98 100 99


 


O2 Delivery  Simple Mask  


 


    





 20  





 05:40 09:30  


 


Temp 37.2 36.5  


 


Pulse 93 78  


 


Resp 18 18  


 


B/P (MAP) 104/67 (79) 109/56 (73)  


 


Pulse Ox 98 99  








Laboratory Tests








Test


 20


13:18 20


06:06 Range/Units


 


 


Lactic Acid Level


 1.54 


 


 0.50-2.00


MMOL/L


 


White Blood Count


 


 24.0 H


 4.3-11.0


10^3/uL


 


Red Blood Count


 


 3.75 L


 4.35-5.85


10^6/uL


 


Hemoglobin  11.8  11.5-16.0  G/DL


 


Hematocrit  36  35-52  %


 


Mean Corpuscular Volume  95  80-99  FL


 


Mean Corpuscular Hemoglobin  31  25-34  PG


 


Mean Corpuscular Hemoglobin


Concent 


 33 


 32-36  G/DL





 


Red Cell Distribution Width  13.5  10.0-14.5  %


 


Platelet Count


 


 142 


 130-400


10^3/uL


 


Mean Platelet Volume  10.2  7.4-10.4  FL


 


Neutrophils (%) (Auto)  83 H 42-75  %


 


Lymphocytes (%) (Auto)  10 L 12-44  %


 


Monocytes (%) (Auto)  6  0-12  %


 


Eosinophils (%) (Auto)  1  0-10  %


 


Basophils (%) (Auto)  0  0-10  %


 


Neutrophils # (Auto)  19.9 H 1.8-7.8  X 10^3


 


Lymphocytes # (Auto)  2.4  1.0-4.0  X 10^3


 


Monocytes # (Auto)  1.5 H 0.0-1.0  X 10^3


 


Eosinophils # (Auto)


 


 0.2 


 0.0-0.3


10^3/uL


 


Basophils # (Auto)


 


 0.0 


 0.0-0.1


10^3/uL











Physical Exam:


General - Alert and oriented, no apparent distress


Abdomen - Soft, appropriately tender to palpation, non-distended, fundus firm at

 umbilicus


Extremities - no edema, negative Maddy's bilaterally 


[]





Assessment:


[] post-partum day # [], status post [] vaginal delivery.


Recovering well, hemodynamically stable


2. chorioamnionitis/fever in labor











Plan:


Routine postpartum care.


Encourage breast feeding.


Encourage ambulation.


Ferrous sulfate supplementation.


Plan for discharge tomorrow





Vitals - Labs


Vital Signs - I&O





Vital Signs








  Date Time  Temp Pulse Resp B/P (MAP) Pulse Ox O2 Delivery O2 Flow Rate FiO2


 


20 09:30 36.5 78 18 109/56 (73) 99   


 


20 05:40 37.2 93 18 104/67 (79) 98   


 


20 23:55 36.7 90 18 102/56 (71) 99   


 


20 20:40 36.4 83 18 105/66 (79) 100   


 


20 17:54 36.6 105 16 104/65 (78) 98 Simple Mask  


 


20 17:51 36.6       


 


20 14:30 37.8 113 16 101/53 (69) 97 Nasal Cannula  


 


20 12:30 37.4 114  90/52 (65)    


 


20 12:15  111  94/53 (67)    


 


20 12:00  115  95/59 (71)    


 


20 11:45  121  90/59 (69)    














I & O 


 


 20





 07:00


 


Intake Total 4690 ml


 


Output Total 1650 ml


 


Balance 3040 ml











Labs


Laboratory Tests


20 13:18: Lactic Acid Level 1.54


20 06:06: 


White Blood Count 24.0H, Red Blood Count 3.75L, Hemoglobin 11.8, Hematocrit 36, 

Mean Corpuscular Volume 95, Mean Corpuscular Hemoglobin 31, Mean Corpuscular 

Hemoglobin Concent 33, Red Cell Distribution Width 13.5, Platelet Count 142, 

Mean Platelet Volume 10.2, Neutrophils (%) (Auto) 83H, Lymphocytes (%) (Auto) 

10L, Monocytes (%) (Auto) 6, Eosinophils (%) (Auto) 1, Basophils (%) (Auto) 0, 

Neutrophils # (Auto) 19.9H, Lymphocytes # (Auto) 2.4, Monocytes # (Auto) 1.5H, 

Eosinophils # (Auto) 0.2, Basophils # (Auto) 0.0





Microbiology


20 Influenza Types A,B Antigen (ELLIOT) - Final, Complete


          


4/15/20 Urine Culture - Final, Complete


          3 or more isolates











DUANE WEBBER DO               2020 11:35

## 2020-04-18 VITALS — DIASTOLIC BLOOD PRESSURE: 72 MMHG | SYSTOLIC BLOOD PRESSURE: 108 MMHG

## 2020-04-18 VITALS — DIASTOLIC BLOOD PRESSURE: 80 MMHG | SYSTOLIC BLOOD PRESSURE: 127 MMHG

## 2020-04-18 VITALS — SYSTOLIC BLOOD PRESSURE: 123 MMHG | DIASTOLIC BLOOD PRESSURE: 72 MMHG

## 2020-04-18 VITALS — SYSTOLIC BLOOD PRESSURE: 105 MMHG | DIASTOLIC BLOOD PRESSURE: 51 MMHG

## 2020-04-18 VITALS — DIASTOLIC BLOOD PRESSURE: 70 MMHG | SYSTOLIC BLOOD PRESSURE: 102 MMHG

## 2020-04-18 VITALS — SYSTOLIC BLOOD PRESSURE: 114 MMHG | DIASTOLIC BLOOD PRESSURE: 83 MMHG

## 2020-04-18 VITALS — SYSTOLIC BLOOD PRESSURE: 121 MMHG | DIASTOLIC BLOOD PRESSURE: 84 MMHG

## 2020-04-18 LAB
BASOPHILS # BLD AUTO: 0 10^3/UL (ref 0–0.1)
BASOPHILS NFR BLD AUTO: 0 % (ref 0–10)
EOSINOPHIL # BLD AUTO: 0.3 10^3/UL (ref 0–0.3)
EOSINOPHIL NFR BLD AUTO: 1 % (ref 0–10)
ERYTHROCYTE [DISTWIDTH] IN BLOOD BY AUTOMATED COUNT: 13.1 % (ref 10–14.5)
HCT VFR BLD CALC: 36 % (ref 35–52)
HGB BLD-MCNC: 11.9 G/DL (ref 11.5–16)
LYMPHOCYTES # BLD AUTO: 2.9 X 10^3 (ref 1–4)
LYMPHOCYTES NFR BLD AUTO: 15 % (ref 12–44)
MANUAL DIFFERENTIAL PERFORMED BLD QL: NO
MCH RBC QN AUTO: 32 PG (ref 25–34)
MCHC RBC AUTO-ENTMCNC: 33 G/DL (ref 32–36)
MCV RBC AUTO: 95 FL (ref 80–99)
MONOCYTES # BLD AUTO: 1.4 X 10^3 (ref 0–1)
MONOCYTES NFR BLD AUTO: 8 % (ref 0–12)
NEUTROPHILS # BLD AUTO: 14.5 X 10^3 (ref 1.8–7.8)
NEUTROPHILS NFR BLD AUTO: 76 % (ref 42–75)
PLATELET # BLD: 164 10^3/UL (ref 130–400)
PMV BLD AUTO: 10.8 FL (ref 7.4–10.4)
WBC # BLD AUTO: 19.1 10^3/UL (ref 4.3–11)

## 2020-04-18 RX ADMIN — IBUPROFEN SCH MG: 600 TABLET ORAL at 09:15

## 2020-04-18 RX ADMIN — ACETAMINOPHEN SCH MG: 500 TABLET ORAL at 21:40

## 2020-04-18 RX ADMIN — IBUPROFEN SCH MG: 600 TABLET ORAL at 04:01

## 2020-04-18 RX ADMIN — FERROUS SULFATE TAB 325 MG (65 MG ELEMENTAL FE) SCH MG: 325 (65 FE) TAB at 09:15

## 2020-04-18 RX ADMIN — ACETAMINOPHEN SCH MG: 500 TABLET ORAL at 13:15

## 2020-04-18 RX ADMIN — DOCUSATE SODIUM SCH MG: 100 CAPSULE ORAL at 09:15

## 2020-04-18 RX ADMIN — DOCUSATE SODIUM SCH MG: 100 CAPSULE ORAL at 21:40

## 2020-04-18 RX ADMIN — VITAMIN A ACETATE, .BETA.-CAROTENE, ASCORBIC ACID, CHOLECALCIFEROL, .ALPHA.-TOCOPHEROL ACETATE, DL-, THIAMINE MONONITRATE, RIBOFLAVIN, NIACINAMIDE, PYRIDOXINE HYDROCHLORIDE, FOLIC ACID, CYANOCOBALAMIN, CALCIUM CARBONATE, FERROUS FUMARATE, ZINC OXIDE, AND CUPRIC OXIDE SCH EA: 2000; 2000; 120; 400; 22; 1.84; 3; 20; 10; 1; 12; 200; 27; 25; 2 TABLET ORAL at 09:15

## 2020-04-18 RX ADMIN — IBUPROFEN SCH MG: 600 TABLET ORAL at 16:38

## 2020-04-18 RX ADMIN — ACETAMINOPHEN SCH MG: 500 TABLET ORAL at 04:00

## 2020-04-18 NOTE — POSTPARTUM PROGRESS NOTE
Postpartum Note


Postpartum Note


Postpartum Day # 2 s/p 


Remains afebrile and leukocytosis is improving. 








Subjective:


Patient is without complaints. Ambulating, voiding. Tolerating a regular diet 

without nausea or vomiting. Normal lochia. Pain is well controlled with oral pa

in medications. breast feeding.





Objective:











 20





 20:19 00:20


 


Temp 36.6 36.6


 


Pulse 76 86


 


Resp 20 19


 


B/P (MAP) 108/68 (81) 102/70 (81)


 


Pulse Ox 98 98


 


O2 Delivery  Room Air








Laboratory Tests








Test


 20


04:25 Range/Units


 


 


White Blood Count


 19.1 H


 4.3-11.0


10^3/uL


 


Red Blood Count


 3.77 L


 4.35-5.85


10^6/uL


 


Hemoglobin 11.9  11.5-16.0  G/DL


 


Hematocrit 36  35-52  %


 


Mean Corpuscular Volume 95  80-99  FL


 


Mean Corpuscular Hemoglobin 32  25-34  PG


 


Mean Corpuscular Hemoglobin


Concent 33 


 32-36  G/DL





 


Red Cell Distribution Width 13.1  10.0-14.5  %


 


Platelet Count


 164 


 130-400


10^3/uL


 


Mean Platelet Volume 10.8 H 7.4-10.4  FL


 


Neutrophils (%) (Auto) 76 H 42-75  %


 


Lymphocytes (%) (Auto) 15  12-44  %


 


Monocytes (%) (Auto) 8  0-12  %


 


Eosinophils (%) (Auto) 1  0-10  %


 


Basophils (%) (Auto) 0  0-10  %


 


Neutrophils # (Auto) 14.5 H 1.8-7.8  X 10^3


 


Lymphocytes # (Auto) 2.9  1.0-4.0  X 10^3


 


Monocytes # (Auto) 1.4 H 0.0-1.0  X 10^3


 


Eosinophils # (Auto)


 0.3 


 0.0-0.3


10^3/uL


 


Basophils # (Auto)


 0.0 


 0.0-0.1


10^3/uL











Physical Exam:


General - Alert and oriented, no apparent distress


Abdomen - Soft, appropriately tender to palpation, non-distended, fundus firm at

umbilicus


Extremities - no edema, negative Maddy's bilaterally 


[]





Assessment:


1. post-partum day # 2, status post  vaginal delivery.


Recovering well, hemodynamically stable


2.  Chorioamnionitis - improving











Plan:


Routine postpartum care.


Encourage breast feeding.


Encourage ambulation.


Ferrous sulfate supplementation.


Plan for discharge tomorrow or to parent room





Vitals - Labs


Vital Signs - I&O





Vital Signs








  Date Time  Temp Pulse Resp B/P (MAP) Pulse Ox O2 Delivery O2 Flow Rate FiO2


 


20 00:20 36.6 86 19 102/70 (81) 98 Room Air  


 


20 20:19 36.6 76 20 108/68 (81) 98   


 


20 14:00 36.8 96 17 110/62 (78) 98   


 


20 09:30 36.5 78 18 109/56 (73) 99   











Labs


Laboratory Tests


20 06:06: 


White Blood Count 24.0H, Red Blood Count 3.75L, Hemoglobin 11.8, Hematocrit 36, 

Mean Corpuscular Volume 95, Mean Corpuscular Hemoglobin 31, Mean Corpuscular 

Hemoglobin Concent 33, Red Cell Distribution Width 13.5, Platelet Count 142, 

Mean Platelet Volume 10.2, Neutrophils (%) (Auto) 83H, Lymphocytes (%) (Auto) 

10L, Monocytes (%) (Auto) 6, Eosinophils (%) (Auto) 1, Basophils (%) (Auto) 0, 

Neutrophils # (Auto) 19.9H, Lymphocytes # (Auto) 2.4, Monocytes # (Auto) 1.5H, 

Eosinophils # (Auto) 0.2, Basophils # (Auto) 0.0


20 04:25: 


White Blood Count 19.1H, Red Blood Count 3.77L, Hemoglobin 11.9, Hematocrit 36, 

Mean Corpuscular Volume 95, Mean Corpuscular Hemoglobin 32, Mean Corpuscular 

Hemoglobin Concent 33, Red Cell Distribution Width 13.1, Platelet Count 164, 

Mean Platelet Volume 10.8H, Neutrophils (%) (Auto) 76H, Lymphocytes (%) (Auto) 

15, Monocytes (%) (Auto) 8, Eosinophils (%) (Auto) 1, Basophils (%) (Auto) 0, 

Neutrophils # (Auto) 14.5H, Lymphocytes # (Auto) 2.9, Monocytes # (Auto) 1.4H, 

Eosinophils # (Auto) 0.3, Basophils # (Auto) 0.0





Microbiology


20 Gram Stain - Final, Resulted


          


20 Anaerobic Culture, Resulted


          Pending


20 Surgical Culture - Preliminary, Resulted


          Mixed Bacterial Najma With


20 Influenza Types A,B Antigen (ELLIOT) - Final, Complete


          


4/15/20 Urine Culture - Final, Complete


          3 or more isolates











DUANE WEBBER DO               2020 06:07

## 2020-04-18 NOTE — NUR
PT IN BED, JUST FINISHED BREASTFEEDING INFANT. MEDS GIVEN PO; SEE EMAR FOR FURTHER. VS 
OBTAINED. INITIAL SHIFT ASSESSMENT COMPLETED; SEE INTERVENTION FOR FURTHER. NO NEEDS VOICED, 
CALL LIGHT WITHIN REACH.

## 2020-04-18 NOTE — NUR
PT IN BED, HOLDING INFANT. VS OBTAINED. TYLENOL GIVEN PO; SEE EMAR FOR FURTHER. FRESH WATER 
PROVIDED. NO FURTHER NEEDS VOICED.

## 2020-04-18 NOTE — NUR
PT BREASTFEEDING INFANT AT THIS TIME. INFORMED TO CALL WHEN AVAILABLE TO MOVE ROOMS DUE TO 
COLD TEMPS AND COLD WATER IN CURRENT ROOM. PT VERBALIZES UNDERSTANDING.

## 2020-04-18 NOTE — NUR
PT SITTING UP ON THE COUCH, HOLDING INFANT. S/O AT PT'S SIDE. VS OBTAINED. ROUTINE MOTRIN 
GIVEN PO; SEE EMAR FOR FURTHER. NO NEEDS VOICED.

## 2020-04-19 VITALS — DIASTOLIC BLOOD PRESSURE: 70 MMHG | SYSTOLIC BLOOD PRESSURE: 114 MMHG

## 2020-04-19 VITALS — SYSTOLIC BLOOD PRESSURE: 107 MMHG | DIASTOLIC BLOOD PRESSURE: 73 MMHG

## 2020-04-19 LAB
BASOPHILS # BLD AUTO: 0.1 10^3/UL (ref 0–0.1)
BASOPHILS NFR BLD AUTO: 1 % (ref 0–10)
EOSINOPHIL # BLD AUTO: 0.4 10^3/UL (ref 0–0.3)
EOSINOPHIL NFR BLD AUTO: 3 % (ref 0–10)
ERYTHROCYTE [DISTWIDTH] IN BLOOD BY AUTOMATED COUNT: 12.9 % (ref 10–14.5)
HCT VFR BLD CALC: 37 % (ref 35–52)
HGB BLD-MCNC: 12.7 G/DL (ref 11.5–16)
LYMPHOCYTES # BLD AUTO: 3.1 X 10^3 (ref 1–4)
LYMPHOCYTES NFR BLD AUTO: 25 % (ref 12–44)
MANUAL DIFFERENTIAL PERFORMED BLD QL: NO
MCH RBC QN AUTO: 32 PG (ref 25–34)
MCHC RBC AUTO-ENTMCNC: 34 G/DL (ref 32–36)
MCV RBC AUTO: 94 FL (ref 80–99)
MONOCYTES # BLD AUTO: 0.8 X 10^3 (ref 0–1)
MONOCYTES NFR BLD AUTO: 6 % (ref 0–12)
NEUTROPHILS # BLD AUTO: 8 X 10^3 (ref 1.8–7.8)
NEUTROPHILS NFR BLD AUTO: 65 % (ref 42–75)
PLATELET # BLD: 193 10^3/UL (ref 130–400)
PMV BLD AUTO: 9.9 FL (ref 7.4–10.4)
WBC # BLD AUTO: 12.3 10^3/UL (ref 4.3–11)

## 2020-04-19 RX ADMIN — DOCUSATE SODIUM SCH MG: 100 CAPSULE ORAL at 10:46

## 2020-04-19 RX ADMIN — ACETAMINOPHEN SCH MG: 500 TABLET ORAL at 06:21

## 2020-04-19 RX ADMIN — IBUPROFEN SCH MG: 600 TABLET ORAL at 06:32

## 2020-04-19 RX ADMIN — VITAMIN A ACETATE, .BETA.-CAROTENE, ASCORBIC ACID, CHOLECALCIFEROL, .ALPHA.-TOCOPHEROL ACETATE, DL-, THIAMINE MONONITRATE, RIBOFLAVIN, NIACINAMIDE, PYRIDOXINE HYDROCHLORIDE, FOLIC ACID, CYANOCOBALAMIN, CALCIUM CARBONATE, FERROUS FUMARATE, ZINC OXIDE, AND CUPRIC OXIDE SCH EA: 2000; 2000; 120; 400; 22; 1.84; 3; 20; 10; 1; 12; 200; 27; 25; 2 TABLET ORAL at 10:46

## 2020-04-19 RX ADMIN — ACETAMINOPHEN SCH MG: 500 TABLET ORAL at 14:30

## 2020-04-19 RX ADMIN — IBUPROFEN SCH MG: 600 TABLET ORAL at 02:25

## 2020-04-19 RX ADMIN — IBUPROFEN SCH MG: 600 TABLET ORAL at 06:28

## 2020-04-19 RX ADMIN — FERROUS SULFATE TAB 325 MG (65 MG ELEMENTAL FE) SCH MG: 325 (65 FE) TAB at 10:46

## 2020-04-19 RX ADMIN — IBUPROFEN SCH MG: 600 TABLET ORAL at 12:15

## 2020-04-19 RX ADMIN — ACETAMINOPHEN SCH MG: 500 TABLET ORAL at 06:33

## 2020-04-19 NOTE — SHORT STAY SUMMARY
Discharge Summary


Hospital Course


Was the Problem List Reviewed?:  Yes


Final Diagnosis:  chorioamnionitis; 38 week gestation; vaginal deliv


Hospital Course


Date of Admission: Apr 15, 2020 at 13:50 


Admission Diagnosis :  





Family Physician/Provider: No,Local Physician  





Date of Discharge: 20 


Discharge Diagnosis: [ ]








Hospital Course:


[ ]














Labs and Pending Lab Test:


Laboratory Tests


20 10:52: 


White Blood Count 12.3H, Red Blood Count 3.92L, Hemoglobin 12.7, Hematocrit 37, 

Mean Corpuscular Volume 94, Mean Corpuscular Hemoglobin 32, Mean Corpuscular 

Hemoglobin Concent 34, Red Cell Distribution Width 12.9, Platelet Count 193, 

Mean Platelet Volume 9.9, Neutrophils (%) (Auto) 65, Lymphocytes (%) (Auto) 25, 

Monocytes (%) (Auto) 6, Eosinophils (%) (Auto) 3, Basophils (%) (Auto) 1, 

Neutrophils # (Auto) 8.0H, Lymphocytes # (Auto) 3.1, Monocytes # (Auto) 0.8, 

Eosinophils # (Auto) 0.4H, Basophils # (Auto) 0.1





Microbiology


20 Gram Stain - Final, Resulted


          


20 Anaerobic Culture, Resulted


          Pending


20 Surgical Culture - Preliminary, Resulted


          Mixed Bacterial Najma With


          Usual Vaginal Najma


          Streptococcus viridans


          Escherichia coli


20 Influenza Types A,B Antigen (ELLIOT) - Final, Complete


          


4/15/20 Urine Culture - Final, Complete


          3 or more isolates





Home Meds


Active


Reported


Prenatal Vitamins Tablet (Prenatal Vit/Iron Fumarate/FA) 1 Each Tablet 1 Each PO

DAILY


Assessment/Pt Instructions


Schedule follow up appointment with Dr. Webber/Vale for 1 week post partum 

(call Monday for appointment); this could be a phone call visit, but schedule it

and the nurse will call at the time of the appointment.  Please leave a number 

that you can be reached at


6 week post partum exam with Dr. Webber





Discharge Instructions


Discharge Diet:  No Restrictions


Activity as Tolerated:  Yes





Discharge Physical Examination


General Appearance:  Alert


Respiratory:  Clear to Auscultation, Normal Air Movement


Cardiovascular:  Regular Rate


Allergies:  


Coded Allergies:  


     No Known Drug Allergies (Unverified , 3/11/20)





Discharge Summary


Date of Admission


Apr 15, 2020 at 13:50


Date of Discharge


20


Discharge Date:  2020


Admission Diagnosis


Rupture of membranes


38 week gestation


Consults/Procedures


Procedures


Epidural


Vaginal delivery





Clinical Quality Measures


DVT/VTE Risk/Contraindication:


Risk Factor Score Per Nursin


RFS Level Per Nursing on Admit:  1=Low/No VTE PPX











DUANE WEBBER DO               2020 12:54

## 2020-04-19 NOTE — POSTPARTUM PROGRESS NOTE
Postpartum Note


Postpartum Note


Postpartum Day # 3 s/p 


Leukocytosis is improving.  Baby will have 3 more days of antibiotics. She will 

be discharged to parent status/boarding. 


culture results reviewed.  She was treated with Unasyn and Flagyl and this 

should cover strep and usual vaginal nader.  Requested sens on the micro 

specimen


Baby currently on Amp/Gent


Afebrile since pp day 1.  WBC trending downward.  No left shift








Subjective:


Patient is without complaints. Ambulating, voiding. Tolerating a regular diet 

without nausea or vomiting. Normal lochia. Pain is well controlled with oral 

pain medications. breast feeding. RN states breast feeding seems to be going 

well. 





Objective:











 20





 02:25 08:00


 


Temp 36.5 36.8


 


Pulse 71 82


 


Resp 16 18


 


B/P (MAP) 107/73 (84) 114/70 (85)


 


Pulse Ox 99 98


 


O2 Delivery Room Air Room Air








Laboratory Tests








Test


 20


10:52 Range/Units


 


 


White Blood Count


 12.3 H


 4.3-11.0


10^3/uL


 


Red Blood Count


 3.92 L


 4.35-5.85


10^6/uL


 


Hemoglobin 12.7  11.5-16.0  G/DL


 


Hematocrit 37  35-52  %


 


Mean Corpuscular Volume 94  80-99  FL


 


Mean Corpuscular Hemoglobin 32  25-34  PG


 


Mean Corpuscular Hemoglobin


Concent 34 


 32-36  G/DL





 


Red Cell Distribution Width 12.9  10.0-14.5  %


 


Platelet Count


 193 


 130-400


10^3/uL


 


Mean Platelet Volume 9.9  7.4-10.4  FL


 


Neutrophils (%) (Auto) 65  42-75  %


 


Lymphocytes (%) (Auto) 25  12-44  %


 


Monocytes (%) (Auto) 6  0-12  %


 


Eosinophils (%) (Auto) 3  0-10  %


 


Basophils (%) (Auto) 1  0-10  %


 


Neutrophils # (Auto) 8.0 H 1.8-7.8  X 10^3


 


Lymphocytes # (Auto) 3.1  1.0-4.0  X 10^3


 


Monocytes # (Auto) 0.8  0.0-1.0  X 10^3


 


Eosinophils # (Auto)


 0.4 H


 0.0-0.3


10^3/uL


 


Basophils # (Auto)


 0.1 


 0.0-0.1


10^3/uL











Physical Exam:


General - Alert and oriented, no apparent distress


Abdomen - Soft, appropriately tender to palpation, non-distended, fundus firm at

umbilicus


Extremities - no edema, negative Maddy's bilaterally 


[





Assessment:


1. post-partum day # 3, status post spontaneous vaginal delivery.


Recovering well, hemodynamically stable


2.  Leukocytosis/prolonge rupture of membranes/ chorioamnionitis - resolved











Plan:


Routine postpartum care.


Encourage breast feeding.


Encourage ambulation.


Plan for discharge to parent room





Vitals - Labs


Vital Signs - I&O





Vital Signs








  Date Time  Temp Pulse Resp B/P (MAP) Pulse Ox O2 Delivery O2 Flow Rate FiO2


 


20 08:00 36.8 82 18 114/70 (85) 98 Room Air  


 


20 02:25 36.5 71 16 107/73 (84) 99 Room Air  


 


20 21:40 36.4 72 16 108/72 (84) 98 Room Air  


 


20 16:35 36.9 87 18 105/51 (69) 97 Room Air  


 


20 13:12 36.4 73 18 123/72 (89) 97 Room Air  











Labs


Laboratory Tests


20 10:52: 


White Blood Count 12.3H, Red Blood Count 3.92L, Hemoglobin 12.7, Hematocrit 37, 

Mean Corpuscular Volume 94, Mean Corpuscular Hemoglobin 32, Mean Corpuscular 

Hemoglobin Concent 34, Red Cell Distribution Width 12.9, Platelet Count 193, 

Mean Platelet Volume 9.9, Neutrophils (%) (Auto) 65, Lymphocytes (%) (Auto) 25, 

Monocytes (%) (Auto) 6, Eosinophils (%) (Auto) 3, Basophils (%) (Auto) 1, 

Neutrophils # (Auto) 8.0H, Lymphocytes # (Auto) 3.1, Monocytes # (Auto) 0.8, 

Eosinophils # (Auto) 0.4H, Basophils # (Auto) 0.1





Microbiology


20 Gram Stain - Final, Resulted


          


20 Anaerobic Culture, Resulted


          Pending


20 Surgical Culture - Preliminary, Resulted


          Mixed Bacterial Nader With


          Usual Vaginal Nader


          Streptococcus viridans


          Escherichia coli


20 Influenza Types A,B Antigen (ELLIOT) - Final, Complete


          


4/15/20 Urine Culture - Final, Complete


          3 or more isolates











DUANE WEBBER DO               2020 12:47

## 2020-04-19 NOTE — NUR
EBEN IN MICRO CALLED BY THIS RN WITH ORDERS FROM DR WEBBER TO DO SENSITIVITY ON TISSUE 
SPECIMEN, SPECIFICALLY, ECOLI.

## 2020-04-19 NOTE — NUR
PT DISCHARGED FROM HOSPITAL CARE. PT CONTINUES TO ROOM IN WITH INFANT AT THIS TIME D/T CONT 
HOSPITALIZATION FOR ATB. PT AND SPOUSE GIVEN PT D/C INSTRUCTIONS. SPOUSE IS ARRANGING TO 
HAVE MEDICATION, MOTRIN AND TYLENOL PICKED UP AT PHARMACY AND DELIVERED TO THEM. SPOUSE WILL 
CONTINUE TO STAY W/ MOTHER AND INFANT.